# Patient Record
Sex: MALE | Employment: OTHER | URBAN - METROPOLITAN AREA
[De-identification: names, ages, dates, MRNs, and addresses within clinical notes are randomized per-mention and may not be internally consistent; named-entity substitution may affect disease eponyms.]

---

## 2024-04-30 ENCOUNTER — HOSPITAL ENCOUNTER (EMERGENCY)
Age: 39
Discharge: HOME | End: 2024-04-30
Attending: EMERGENCY MEDICINE
Payer: COMMERCIAL

## 2024-04-30 VITALS
WEIGHT: 315 LBS | DIASTOLIC BLOOD PRESSURE: 70 MMHG | OXYGEN SATURATION: 96 % | SYSTOLIC BLOOD PRESSURE: 142 MMHG | TEMPERATURE: 97.7 F | HEIGHT: 72 IN | RESPIRATION RATE: 18 BRPM | BODY MASS INDEX: 42.66 KG/M2 | HEART RATE: 95 BPM

## 2024-04-30 DIAGNOSIS — K62.5 BRBPR (BRIGHT RED BLOOD PER RECTUM): Primary | ICD-10-CM

## 2024-04-30 LAB
ALBUMIN SERPL-MCNC: 4.2 G/DL (ref 3.4–5)
ALBUMIN/GLOB SERPL: 1.6 {RATIO}
ALP SERPL-CCNC: 104 U/L (ref 40–129)
ALT SERPL-CCNC: 51 U/L (ref 10–40)
ANION GAP SERPL CALCULATED.3IONS-SCNC: 12 MMOL/L (ref 3–16)
AST SERPL-CCNC: 34 U/L (ref 15–37)
BASOPHILS # BLD: 0.02 K/UL (ref 0–0.2)
BASOPHILS NFR BLD: 0 %
BILIRUB SERPL-MCNC: 0.5 MG/DL (ref 0–1)
BUN SERPL-MCNC: 13 MG/DL (ref 7–20)
CALCIUM SERPL-MCNC: 9.1 MG/DL (ref 8.3–10.6)
CHLORIDE SERPL-SCNC: 105 MMOL/L (ref 99–110)
CO2 SERPL-SCNC: 22 MMOL/L (ref 21–32)
CREAT SERPL-MCNC: 0.9 MG/DL (ref 0.7–1.8)
EOSINOPHIL # BLD: 0.09 K/UL (ref 0–0.6)
EOSINOPHILS RELATIVE PERCENT: 1 %
ERYTHROCYTE [DISTWIDTH] IN BLOOD BY AUTOMATED COUNT: 13.2 % (ref 12.4–15.4)
GFR SERPL CREATININE-BSD FRML MDRD: >90 ML/MIN/1.73M2
GLUCOSE SERPL-MCNC: 206 MG/DL (ref 70–99)
HCT VFR BLD AUTO: 43.7 % (ref 40.5–52.5)
HGB BLD-MCNC: 14.9 G/DL (ref 13.5–17.5)
IMM GRANULOCYTES # BLD AUTO: 0.06 K/UL (ref 0–0.5)
IMM GRANULOCYTES NFR BLD: 1 %
INR PPP: 1 (ref 0.9–1.2)
LYMPHOCYTES NFR BLD: 3.34 K/UL (ref 1–5.1)
LYMPHOCYTES RELATIVE PERCENT: 39 %
MCH RBC QN AUTO: 28.9 PG (ref 26–34)
MCHC RBC AUTO-ENTMCNC: 34.1 G/DL (ref 31–36)
MCV RBC AUTO: 84.7 FL (ref 80–100)
MONOCYTES NFR BLD: 0.62 K/UL (ref 0–1.3)
MONOCYTES NFR BLD: 7 %
NEUTROPHILS NFR BLD: 52 %
NEUTS SEG NFR BLD: 4.53 K/UL (ref 1.7–7.7)
PARTIAL THROMBOPLASTIN TIME: 28.3 SEC (ref 22.1–36.4)
PLATELET # BLD AUTO: 263 K/UL (ref 135–450)
PMV BLD AUTO: 11.2 FL
POTASSIUM SERPL-SCNC: 4 MMOL/L (ref 3.5–5.1)
PROT SERPL-MCNC: 6.9 G/DL (ref 6.4–8.2)
PROTHROMBIN TIME: 13.8 SEC (ref 11.9–14.9)
RBC # BLD AUTO: 5.16 M/UL (ref 4.2–5.9)
SODIUM SERPL-SCNC: 140 MMOL/L (ref 136–145)
WBC OTHER # BLD: 8.7 K/UL (ref 4–11)

## 2024-04-30 PROCEDURE — 36415 COLL VENOUS BLD VENIPUNCTURE: CPT

## 2024-04-30 PROCEDURE — 80053 COMPREHEN METABOLIC PANEL: CPT

## 2024-04-30 PROCEDURE — 85610 PROTHROMBIN TIME: CPT

## 2024-04-30 PROCEDURE — 85025 COMPLETE CBC W/AUTO DIFF WBC: CPT

## 2024-04-30 PROCEDURE — 85730 THROMBOPLASTIN TIME PARTIAL: CPT

## 2024-04-30 PROCEDURE — 99283 EMERGENCY DEPT VISIT LOW MDM: CPT

## 2024-04-30 ASSESSMENT — PAIN DESCRIPTION - LOCATION: LOCATION: RECTUM

## 2024-04-30 ASSESSMENT — PAIN DESCRIPTION - DESCRIPTORS: DESCRIPTORS: DISCOMFORT

## 2024-04-30 ASSESSMENT — PAIN SCALES - GENERAL: PAINLEVEL_OUTOF10: 4

## 2024-04-30 ASSESSMENT — PAIN - FUNCTIONAL ASSESSMENT: PAIN_FUNCTIONAL_ASSESSMENT: 0-10

## 2024-04-30 NOTE — ED NOTES
According to patient has a hemorrhoid flare up. Tried to get in to PCP. Having pain in rectum.     Patient alert and oriented x4. GCS 15/15. Skin appropriate for ethnicity, dry and intact. No signs of acute distress noted at this time. Regular respiratory pattern, normal respiratory depth, unlabored respirations. 4/10 pain.     Pt placed on a continuous pulse oximetry and telemetry monitoring. Pt ambulates independently. Bedside Monitor on with Alarms audible and alarms set. Call light in reach, bed side table within reach, will continue to monitor.

## 2024-04-30 NOTE — ED PROVIDER NOTES
105 99 - 110 mmol/L    CO2 22 21 - 32 mmol/L    Anion Gap 12 3 - 16 mmol/L    Glucose 206 (H) 70 - 99 mg/dL    BUN 13 7 - 20 mg/dL    Creatinine 0.9 0.7 - 1.8 mg/dL    Est, Glom Filt Rate >90 >60 mL/min/1.73m2    Calcium 9.1 8.3 - 10.6 mg/dL    Total Protein 6.9 6.4 - 8.2 g/dL    Albumin 4.2 3.4 - 5.0 g/dL    Albumin/Globulin Ratio 1.6     Total Bilirubin 0.5 0.0 - 1.0 mg/dL    Alkaline Phosphatase 104 40 - 129 U/L    ALT 51 (H) 10 - 40 U/L    AST 34 15 - 37 U/L   Protime-INR   Result Value Ref Range    Protime 13.8 11.9 - 14.9 sec    INR 1.0 0.9 - 1.2   APTT   Result Value Ref Range    APTT 28.3 22.1 - 36.4 sec       ED COURSE/MDM    39 y.o. male presents with with complaint of blood in stool and painful defecation.  On arrival the patient is slightly hypertensive with otherwise reassuring vital signs.  His abdominal exam is benign.  Rectal exam is notable for the absence of bloody stool.  I see no evidence of external hemorrhoids.  Hemoglobin 14.9.  Renal panel no elevation of BUN.  Coags within normal limits.  Plan is discharged home with follow-up with gastroenterology.  Usual strict return precautions for new or worsening symptoms communicated.    - History obtained from: patient   - Records reviewed:  Cardiology OV today shows history of CAD with recent LHC/PCI with PETER as per HPI    I, Robbie Hansen MD, am the primary clinician of record.     Patient instructed to follow up with:   Senthil Sosa MD  [address] [facility] [address] [phone]    Schedule an appointment as soon as possible for a visit   Gastroenterology    [facility] Emergency Dept  [address] [place] [address] [phone]    As needed, if symptoms worsen      All questions were answered and the patient/family expressed understanding and agreement with the plan.     PROCEDURES  None    CRITICAL CARE  N/A    CLINICAL IMPRESSION  1. BRBPR (bright red blood per rectum)        DISPOSITION  Decision To

## 2024-04-30 NOTE — DISCHARGE INSTRUCTIONS
Dear [place],     Thank you for the privilege of caring for you today in the Emergency Department.     You were seen in the emergency department for painful defecation and rectal bleeding.  There was no evidence of significant active bleeding on exam.  Your hemoglobin (red blood cell) level was reassuring.    Please follow-up with gastroenterology by scheduling an appointment with the physician named above.    Please return to the Emergency Department if you develop any new or worsening symptoms, chest pain, shortness of breath, passing out or almost passing out, or for any other concerns.     Regards,     Robbie Hansen MD, FACEP